# Patient Record
Sex: MALE | Race: WHITE | NOT HISPANIC OR LATINO | Employment: FULL TIME | URBAN - METROPOLITAN AREA
[De-identification: names, ages, dates, MRNs, and addresses within clinical notes are randomized per-mention and may not be internally consistent; named-entity substitution may affect disease eponyms.]

---

## 2017-11-09 ENCOUNTER — OFFICE VISIT CONVERTED (OUTPATIENT)
Dept: PULMONOLOGY | Facility: CLINIC | Age: 27
End: 2017-11-09
Attending: INTERNAL MEDICINE

## 2018-01-09 ENCOUNTER — CONVERSION ENCOUNTER (OUTPATIENT)
Dept: CARDIOLOGY | Facility: CLINIC | Age: 28
End: 2018-01-09

## 2018-01-09 ENCOUNTER — OFFICE VISIT CONVERTED (OUTPATIENT)
Dept: CARDIOLOGY | Facility: CLINIC | Age: 28
End: 2018-01-09
Attending: INTERNAL MEDICINE

## 2018-02-01 ENCOUNTER — OFFICE VISIT CONVERTED (OUTPATIENT)
Dept: PULMONOLOGY | Facility: CLINIC | Age: 28
End: 2018-02-01
Attending: INTERNAL MEDICINE

## 2018-05-24 ENCOUNTER — OFFICE VISIT CONVERTED (OUTPATIENT)
Dept: PULMONOLOGY | Facility: CLINIC | Age: 28
End: 2018-05-24
Attending: INTERNAL MEDICINE

## 2019-04-18 ENCOUNTER — OFFICE VISIT CONVERTED (OUTPATIENT)
Dept: PULMONOLOGY | Facility: CLINIC | Age: 29
End: 2019-04-18
Attending: INTERNAL MEDICINE

## 2019-12-20 ENCOUNTER — OFFICE VISIT CONVERTED (OUTPATIENT)
Dept: PULMONOLOGY | Facility: CLINIC | Age: 29
End: 2019-12-20
Attending: NURSE PRACTITIONER

## 2020-05-19 ENCOUNTER — OFFICE VISIT CONVERTED (OUTPATIENT)
Dept: PULMONOLOGY | Facility: CLINIC | Age: 30
End: 2020-05-19
Attending: INTERNAL MEDICINE

## 2020-08-11 ENCOUNTER — OFFICE VISIT CONVERTED (OUTPATIENT)
Dept: PULMONOLOGY | Facility: CLINIC | Age: 30
End: 2020-08-11
Attending: NURSE PRACTITIONER

## 2021-05-16 VITALS
BODY MASS INDEX: 45.1 KG/M2 | HEART RATE: 92 BPM | WEIGHT: 315 LBS | DIASTOLIC BLOOD PRESSURE: 77 MMHG | HEIGHT: 70 IN | SYSTOLIC BLOOD PRESSURE: 142 MMHG

## 2021-05-28 VITALS
BODY MASS INDEX: 71.81 KG/M2 | SYSTOLIC BLOOD PRESSURE: 125 MMHG | OXYGEN SATURATION: 97 % | HEART RATE: 88 BPM | HEIGHT: 69 IN | TEMPERATURE: 98.3 F | DIASTOLIC BLOOD PRESSURE: 60 MMHG | WEIGHT: 315 LBS | SYSTOLIC BLOOD PRESSURE: 110 MMHG | RESPIRATION RATE: 16 BRPM | HEIGHT: 69 IN | DIASTOLIC BLOOD PRESSURE: 73 MMHG | OXYGEN SATURATION: 96 % | BODY MASS INDEX: 46.65 KG/M2 | HEART RATE: 81 BPM | TEMPERATURE: 97.8 F | RESPIRATION RATE: 15 BRPM

## 2021-05-28 VITALS
OXYGEN SATURATION: 97 % | TEMPERATURE: 97.8 F | SYSTOLIC BLOOD PRESSURE: 127 MMHG | HEIGHT: 69 IN | RESPIRATION RATE: 16 BRPM | RESPIRATION RATE: 12 BRPM | HEART RATE: 105 BPM | TEMPERATURE: 98.1 F | WEIGHT: 315 LBS | BODY MASS INDEX: 46.65 KG/M2 | DIASTOLIC BLOOD PRESSURE: 93 MMHG | HEART RATE: 92 BPM | TEMPERATURE: 97.5 F | WEIGHT: 315 LBS | BODY MASS INDEX: 45.1 KG/M2 | HEIGHT: 69 IN | BODY MASS INDEX: 46.65 KG/M2 | DIASTOLIC BLOOD PRESSURE: 83 MMHG | OXYGEN SATURATION: 96 % | HEART RATE: 102 BPM | WEIGHT: 315 LBS | SYSTOLIC BLOOD PRESSURE: 151 MMHG | OXYGEN SATURATION: 96 % | DIASTOLIC BLOOD PRESSURE: 85 MMHG | RESPIRATION RATE: 10 BRPM | SYSTOLIC BLOOD PRESSURE: 151 MMHG | HEIGHT: 70 IN

## 2021-05-28 NOTE — PROGRESS NOTES
Patient: ESTRELLITA RILEY     Acct: [id number]     Report: #[id number]  UNIT #: [id number]     : 1990    Encounter Date:2018  PRIMARY CARE: [facility]  ***Signed***  --------------------------------------------------------------------------------------------------------------------  Chief Complaint      Encounter Date      May 24, 2018            Primary Care Provider      SHIMA smith md            Referring Provider            arline smith md            Patient Complaint      Patient is complaining of      f/u soa            VITALS      Height 5 ft 10 in / 177.8 cm      Weight 499 lbs 9 oz / 226.024942 kg      BSA 3.50 m2      BMI 71.7 kg/m2      Temperature 97.5 F / 36.39 C - Oral      Pulse 92      Respirations 12      Blood Pressure 151/83 Sitting, Left Arm      Pulse Oximetry 96%, 2 liters      Exhaled Nitrous Oxide Testin            HPI      The patient is a very pleasant patient 18 year old morbidly obese male with     chronic hypoxic respiratory failure and severe obstructive sleep apnea who     returns for 4 month follow up. He was last seen in the clinic on 18. At     that time the patient was having some difficulties at home.  He had to move in     with his mother after the death of her . He lost his job a [employer] and     has been applying for disability. He returns today stating that he is     continuing to struggle with weight loss. He is down 11 pounds since his last     office visit. He has lost overall 70 pounds in the past year.  He has 45 pounds     more to lose before he will be eligible for weight loss surgery. The patient     has not been using his BiPAP at home as he has had difficulty with the DME     company delivering the equipment and getting him set up with the appropriate     pressure settings.  He wears oxygen 24/7 at 2 liters. He has an intermittent     cough but attributes this to postnasal drainage. He is short of breath with      exertion but this improves when he wears his oxygen. He is not short of breath     at rest. He denies any fevers or chills. He has not been hospitalized recently.     He continues to take Lasix daily for history of heart failure.            Review of Systems as noted in the chart.             Past family, medical, surgical and social histories were all reviewed by myself     with the patient and are unchanged. .             Medications were reviewed by myself with the patient and updated in the chart.      He is using Brovana and Pulmicort nebulizers twice daily and Singulair.            ROS      Constitutional:  Denies: Fatigue, Fever, Weight gain, Weight loss, Chills,     Insomnia, Other      Respiratory/Breathing:  Complains of: Shortness of air, Cough, Denies: Wheezing    , Hemoptysis, Pleuritic pain, Other      Endocrine:  Denies: Polydipsia, Polyuria, Heat/cold intolerance, Diabetes, Other      Eyes:  Denies: Blurred vision, Vision Changes, Other      Ears, nose, mouth, throat:  Denies: Mouth lesions, Thrush, Throat pain,     Hoarseness, Allergies/Hay Fever, Post Nasal Drip, Headaches, Recent Head Injury    , Nose Bleeding, Neck Stiffness, Thyroid Mass, Hearing Loss, Ear Fullness, Dry     Mouth, Nasal or Sinus Pain, Dry Lips, Nasal discharge, Nasal congestion, Other      Cardiovascular:  Denies: Palpitations, Syncope, Claudication, Chest Pain, Wake     up Gasping for air, Leg Swelling, Irregular Heart Rate, Cyanosis, Dyspnea on     Exertion, Other      Gastrointestinal:  Denies: Nausea, Constipation, Diarrhea, Abdominal pain,     Vomiting, Difficulty Swallowing, Reflux/Heartburn, Dysphagia, Jaundice, Bloating    , Melena, Bloody stools, Other      Genitourinary:  Denies: Urinary frequency, Incontinence, Hematuria, Urgency,     Nocturia, Dysuria, Testicular problems, Other      Musculoskeletal:  Denies: Joint Pain, Joint Stiffness, Joint Swelling, Myalgias    , Other      Hematologic/lymphatic:  DENIES:  Lymphadenopathy, Bruising, Bleeding tendencies,     Other      Neurological:  Denies: Headache, Numbness, Weakness, Seizures, Other      Psychiatric:  Denies: Anxiety, Appropriate Effect, Depression, Other      Sleep:  No: Excessive daytime sleep, Morning Headache?, Snoring, Insomnia?,     Stop breathing at sleep?, Other      Integumentary:  Denies: Rash, Dry skin, Skin Warm to Touch, Other      Immunologic/Allergic:  Denies: Latex allergy, Seasonal allergies, Asthma,     Urticaria, Eczema, Other      Immunization status:  No: Up to date            FAMILY/SOCIAL/MEDICAL HX      Surgical History:  Yes: Cholecystectomy, No: Other Surgeries      Stroke - Family Hx:  Grandparent      Heart - Family Hx:  Grandparent      Diabetes - Family Hx:  Grandparent      Cancer/Type - Family Hx:  Grandparent (grandmother ukn  granfather leukemia)      Other Family Medical History:  Grandparent (copd)      Is Father Still Living?:  Yes      Is Mother Still Living?:  Yes      Social History:  Tobacco Use      Smoking status:  Never smoker (11/9/17)      Anticoagulation Therapy:  No      Antibiotic Prophylaxis:  No      Medical History:  Yes: Chronic Bronchitis/COPD (lung disease ), Diabetes,     Hemorrhoids/Rectal Prob (OCCASIONALLY HEARTBURN, CHOLECYSTITIS), High Blood     Pressure (CONTROLLED WITH MEDS), Miscellaneous Medical/oth (has enlarged heart )    , No: Blood Disease, Chemotherapy/Cancer, Deafness or Ringing Ears, Shortness     Of Breath, Sinus Trouble            PREVENTION      Hx Influenza Vaccination:  No      Influenza Vaccine Declined:  Yes      2 or More Falls Past Year?:  No      Fall Past Year with Injury?:  No      Hx Pneumococcal Vaccination:  No      Encouraged to follow-up with:  PCP regarding preventative exams.      Chart initiated by      Jennifer GAUTAM MA            ALLERGIES/MEDICATIONS      Allergies:        Coded Allergies:             NO KNOWN DRUG ALLERGIES (Verified  Allergy, Unknown, 5/24/18)       Medications    Last Reconciled on 5/24/18 10:25 by TUNG GAUTAM      Lisinopril* (Lisinopril*) 10 Mg Tablet      10 MG PO QDAY, #30 TAB 0 Refills         Reported         12/7/17       Albuterol/Ipratropium (Duoneb) 3 Ml Ampul.neb      3 ML INH RTQ6H, #120 NEB 1 Refill         Prov: SILVIA ZAMARRIPA         11/9/17       Furosemide* (Lasix*) 80 Mg Tablet      80 MG PO BID@09,17, #60 TAB 0 Refills         Reported         11/9/17       Neb-Budesonide (Pulmicort) 1 Mg Nebu      1 MG INH RTQDAY, #60 NEB 3 Refills         Prov: SILVIA ZAMARRIPA         10/9/17       Arformoterol Tartrate (Brovana) 15 Mcg/2 Ml Vial.neb      15 MCG INH RTBID, #60 NEB 3 Refills         Prov: SILVIA ZAMARRIPA         10/9/17       MDI-Albuterol (Ventolin HFA*) 18 Gm Hfa.aer.ad      2 PUFFS INH Q6H Y for SHORTNESS OF BREATH, #1 MDI 0 Refills         Reported         10/9/17       Montelukast Sodium (Montelukast*) 10 Mg Tablet      10 MG PO QDAY, TAB         Reported         10/9/17       Metformin Hcl (Metformin ER*) 1,000 Mg Tab.er.24      1000 MG PO BID, #30 TAB.ER 0 Refills         Reported         10/9/17       Metoprolol Tartrate (Lopressor*) 25 Mg Tablet      25 MG PO BID, TAB         Reported         4/23/13      Current Medications      Current Medications Reviewed 5/24/18            EXAM      Vital signs reviewed. The patient was saturating 96% on 2 liters.        GENERAL:  Super morbidly obese male in no acute distress on nasal cannula.        HEENT: Pupils are equally round and reactive to light and accommodation.      Extraocular muscles intact bilateral. Nares patent without hypertrophy of the     turbinates.  TM's are clear bilaterally. Small oropharynx without lesions or     erythema.       NECK:  Supple without tracheal deviation or lymphadenopathy. No thyromegaly     appreciated.       LYMPHATICS: No cervical or supraclavicular lymphadenopathy.       RESPIRATORY:  Diminished breath sounds secondary to body habitus, no wheezes,      rales or rhonchi, tympanic to percussion.      CVS: Distant heart tones secondary to body habitus, regular rate and rhythm, no     murmurs, rubs or gallops, pitting edema bilaterally, equal radial pulses.      GI: Abdomen soft and protuberant with pannus, nontender, nondistended, no     hepatomegaly appreciated.  Bowel sounds present in all 4 quadrants.       MUSCULOSKELETAL: No erythema,  warmth or fluctuance over the major joints     including the knees, ankles, wrists and elbows.        SKIN: No rashes or lesions.       NEUROLOGICAL: Alert and oriented X 3.  No focal deficits on exam. Cranial     nerves II-XII intact bilaterally.       PSYCH: Patient has appropriate mood and affect.      Vtials      Vitals:             Height 5 ft 10 in / 177.8 cm           Weight 499 lbs 9 oz / 226.540001 kg           BSA 3.50 m2           BMI 71.7 kg/m2           Temperature 97.5 F / 36.39 C - Oral           Pulse 92           Respirations 12           Blood Pressure 151/83 Sitting, Left Arm           Pulse Oximetry 96%, 2 liters            REVIEW      Results Reviewed      [facility] Results Reviewed?:  [facility]            Assessment      ASSESSMENT:        The patient is a 28 year old super morbidly obese male with a history of asthma     and chronic hypoxemic respiratory failure on long term oxygen therapy as well     as severe obstructive sleep apnea who presents for routine follow up.             1.  Acute on chronic hypoxemic and hypercapnic respiratory failure      2.  Severe obstructive sleep apnea on home BIPAP      3.  Super morbid obesity.      4.  Obesity hypoventilation syndrome.      5.  Decompensated diastolic and systolic heart failure       6. Left ventricular hypertrophy.      7.  History of moderate persistent asthma.              PLAN:      1.  Continue  Brovana and Pulmicort nebs twice daily.  He was given more     samples of Incruse today.       2. I have asked our  nurse navigator Melody  to help set up his home BiPAP.      This is extremely important as his risk of sudden death is high given his     obesity hypoventilation heart disease and severe apnea.        3. I asked the patient to establish care with cardiology for heart failure but     he has yet to do this.       4. I continued to  the patient on weight loss and encourage him. He is     doing the best he can with the resources he has .       5. The patient did not go to pulmonary rehabilitation as scheduled secondary     with difficulty with transportation.       6. Follow up in 6 months, sooner if needed. Continue to work on weight loss,     continue oxygen and nebulizer treatments.            Patient Education      ACO BMI High above 25:  Counseling Given, Encouraged weight loss, Encourage     dietary changes      Education resources provided:  Yes      Patient Education Provided:  Acute Asthma, How to use a Nebulizer      Time Spent:  > 50% /[facility]                 Disclaimer: Converted document may not contain table formatting or lab diagrams. Please see [employer] for the authenticated document.

## 2021-05-28 NOTE — PROGRESS NOTES
"Patient: POOL LAO     Acct: [id number]     Report: #[id number]  UNIT #: [id number]     : 1990    Encounter Date:2020  PRIMARY CARE: [facility]  ***Signed***  --------------------------------------------------------------------------------------------------------------------  TELEHEALTH NOTE      History of Present Illness            Chief Complaint: 3-6m f/u            Pool Lao is presenting for evaluation via Telehealth visit by phone. Verbal    consent obtained before beginning visit.            Provider spent 11 minutes with the patient during telehealth visit.            The following staff were present during the visit: Noni Calles MA, Odalis Donis DO            The patient is a supermorbid obese young gentleman with severe obstructive sleep    apnea, chronic hypercapnic respiratory failure and chronic hypoxic respiratory     failure. He wears two liters of oxygen at all times.  He is scheduled to have     bariatric schedule on 2020. He calls in today for a follow up visit asking    for an albuterol inhaler.  He states that he has moved into his own apartment     and he has to ascend two flights of stairs and by the time he is to the top of     the stairs he is so out of breath he cannot carry on a conservation and thinks     he would benefit from a rescue inhaler.  He does tell me that he had a \"mild\"     stress related heart attack and has followed up with his cardiologist in the     past month regarding that. He has gotten cardiology clearance for his bariatric     surgery. He unfortunately has let his BiPAP go back to the , he did     not keep up with it, wear it or comply with it. She says he barely snores after     he has lost almost 200 pounds with diet and exercise over the past two years     time.              I reviewed our last clinic note.                         Past Med History      Asthma      Never smoked      Vaccines not current    "   Overview of Symptoms      Pt complains of seasonal allergies            Most Recent Lab Findings      Laboratory Tests      5/10/20 21:07            Laboratory Tests            Test       5/10/20      21:07             Magnesium Level       1.72 mg/dL      (1.60-2.30)            Allergies/Medications      Allergies:        Coded Allergies:             NO KNOWN DRUG ALLERGIES (Verified  Allergy, Unknown, 5/19/20)      Medications    Last Reconciled on 5/19/20 13:58 by SILVIA ZAMARRIPA DO      MDI-Albuterol (Ventolin HFA) 8 Gm Hfa.aer.ad      2 PUFFS INH Q4-6H PRN for SHORTNESS OF BREATH, #1 INH 2 Refills         Prov: SILVIA ZAMARRIPA         5/19/20       Nitroglycerin (Nitrostat*) 0.4 Mg Tablet               Reported         5/19/20       Bisoprolol Fumarate (Bisoprolol Fumarate) 10 Mg Tablet      10 MG PO QDAY, #30 TAB         Reported         12/20/19       (eye drops)   No Conflict Check      for glaucoma         Reported         4/18/19       Empagliflozin (Jardiance) 10 Mg Tablet      10 MG PO QDAY for 30 Days, #30 TAB         Reported         4/18/19       Furosemide* (Lasix*) 80 Mg Tablet      80 MG PO BID@09,17, #60 TAB 0 Refills         Reported         11/9/17       Montelukast Sodium (Montelukast*) 10 Mg Tablet      10 MG PO QDAY, TAB         Reported         10/9/17            Plan/Instructions      Ambulatory Assessment/Plan:        Notes      New Medications      * NITROGLYCERIN (Nitrostat*) 0.4 MG TABLET:       * MDI-Albuterol (Ventolin HFA) 8 GM HFA.AER.AD: 2 PUFFS INH Q4-6H PRN SHORTNESS       OF BREATH #1      Plan/Instructions            * Plan Of Care: ()            * Chronic conditions reviewed and taken into consideration for today's treatment       plan.      * Patient instructed to seek medical attention urgently for new or worsening       symptoms.      * Patient was educated/instructed on their diagnosis, treatment and medications       prior to discharge from the clinic today.             ASSESSMENT:       1.  Dyspnea with exertion.      2. Supermorbid obesity, BMI 71.8.      3.  History of severe sleep apnea, noncompliant with BiPAP.      4. History of chronic hypercapnic and hypoxemic respiratory failure on chronic     oxygen.            PLAN:      1. I have congratulated the patient on his continued weight loss and his     scheduled surgery that is upcoming in the next few weeks.        2. In regards to surgical clearance, he has been cleared from a pulmonary     prospective.      3. I did call him in an albuterol rescue inhaler, but told him that his     difficulties breathing are more likely heart related or deconditioning.  He     could use it on an as needed basis.      4. I would like to see him back in August to see how he is doing post surgery a    nd see if he can be weaned off oxygen.      Codes:  Phone Eval 11-20 mi [id number]            Electronically signed by SILVIA ZAMARRIPA  06/09/2020 11:17       Disclaimer: Converted document may not contain table formatting or lab diagrams. Please see [employer] for the authenticated document.

## 2021-05-28 NOTE — PROGRESS NOTES
Patient: ESTRELLITA RILEY     Acct: [id number]     Report: #[id number]  UNIT #: [id number]     : 1990    Encounter Date:2020  PRIMARY CARE: MARIELY PLAZA  ***Signed***  --------------------------------------------------------------------------------------------------------------------  Chief Complaint      Encounter Date      Aug 11, 2020            Primary Care Provider      MARIELY PLAZA            Referring Provider      GINI RIVERA            Patient Complaint      Patient is complaining of      Pt here for 2-3 month follow up/SARI, Asthma            VITALS      Height 69 in / 175.26 cm      Weight 412 lbs  / 186.863643 kg      BSA 2.81 m2      BMI 60.8 kg/m2      Temperature 97.8 F / 36.56 C - Temporal      Pulse 88      Respirations 15      Blood Pressure 110/60 Sitting, Left Arm      Pulse Oximetry 97%, nasal canula , 2 lpm      Initial Exhaled Nitrous Oxide      Exhaled Nitrous Oxide Results:  9            HPI      The patient is a 30 year old supermorbidly obese gentleman who is a patient of     Dr. Donis's with severe obstructive sleep apnea, chronic hypercapnic     respiratory failure and chronic hypoxic respiratory failure.  The patient is on     2 liters of oxygen. The patient presents for follow up visit today.  The patient    states that he had a gastric sleeve procedure performed on 2020 and has     lost 80 pounds since the surgery. The patient states he is now able to walk     three miles with his oxygen in place, about 6500 to 8000 steps a day. The     patient states he is now down to 412 pounds.  The patient states his breathing     has improved. The patient states that now he can go 25-30 minutes without his     oxygen, however he does wear his oxygen at 2  liters per minute per nasal     cannula. The patient states that he would like to get a portable oxygen     concentrator as it will be easier to perform ADLs and help him to remain more     active instead of having to  carry around heavy tanks.  The patient denies any     fever, chills, night sweats, hemoptysis, purulent sputum production, chest pain,    chest tightness, swollen glands in the head and neck, nausea, vomiting or     diarrhea.  The patient denies any headaches, myalgias, changes in sense of taste    and/or smell or any other coronavirus or flu-like symptoms.  The patient states     that he does have sleep apnea, however does not wear his BiPAP at night and it     did go back to the manufacture because he did not comply with it.  The patient     states he does not wake up gasping for air and denies any morning headaches or     excessive daytime sleepiness. The patient states that he feels that his     breathing is better without his BiPAP machine. The patient states he is able to     perform ADLs without difficulty.            I have personally reviewed the review of systems, past family, social, surgical     and medical histories and I agree with those as entered in the chart.      Copies To:   SILVIA ZAMARRIPA      Constitutional:  Denies: Fatigue, Fever, Weight gain, Weight loss, Chills,     Insomnia, Other      Respiratory/Breathing:  Complains of: Shortness of air; Denies: Wheezing, Cough,    Hemoptysis, Pleuritic pain, Other      Endocrine:  Denies: Polydipsia, Polyuria, Heat/cold intolerance, Diabetes, Other      Eyes:  Denies: Blurred vision, Vision Changes, Other      Ears, nose, mouth, throat:  Denies: Mouth lesions, Thrush, Throat pain,     Hoarseness, Allergies/Hay Fever, Post Nasal Drip, Headaches, Recent Head Injury,    Nose Bleeding, Neck Stiffness, Thyroid Mass, Hearing Loss, Ear Fullness, Dry     Mouth, Nasal or Sinus Pain, Dry Lips, Nasal discharge, Nasal congestion, Other      Cardiovascular:  Denies: Palpitations, Syncope, Claudication, Chest Pain, Wake     up Gasping for air, Leg Swelling, Irregular Heart Rate, Cyanosis, Dyspnea on     Exertion, Other      Gastrointestinal:  Denies:  Nausea, Constipation, Diarrhea, Abdominal pain,     Vomiting, Difficulty Swallowing, Reflux/Heartburn, Dysphagia, Jaundice,     Bloating, Melena, Bloody stools, Other      Genitourinary:  Denies: Urinary frequency, Incontinence, Hematuria, Urgency,     Nocturia, Dysuria, Testicular problems, Other      Musculoskeletal:  Denies: Joint Pain, Joint Stiffness, Joint Swelling, Myalgias,    Other      Hematologic/lymphatic:  DENIES: Lymphadenopathy, Bruising, Bleeding tendencies,     Other      Neurological:  Denies: Headache, Numbness, Weakness, Seizures, Other      Psychiatric:  Denies: Anxiety, Appropriate Effect, Depression, Other      Sleep:  No: Excessive daytime sleep, Morning Headache?, Snoring, Insomnia?, Stop    breathing at sleep?, Other      Integumentary:  Denies: Rash, Dry skin, Skin Warm to Touch, Other      Immunologic/Allergic:  Denies: Latex allergy, Seasonal allergies, Asthma,     Urticaria, Eczema, Other      Immunization status:  No: Up to date            FAMILY/SOCIAL/MEDICAL HX      Surgical History:  Yes: Cholecystectomy, Other Surgeries (Bariatric Surgery     6/2020)      Stroke - Family Hx:  Grandparent      Heart - Family Hx:  Grandparent      Diabetes - Family Hx:  Grandparent      Cancer/Type - Family Hx:  Grandparent (grandmother ukn  granfather leukemia)      Other Family Medical History:  Grandparent (copd)      Is Father Still Living?:  Yes      Is Mother Still Living?:  Yes      Social History:  Tobacco Use      Smoking status:  Never smoker (11/9/17)      Anticoagulation Therapy:  No      Antibiotic Prophylaxis:  No      Medical History:  Yes: Chronic Bronchitis/COPD (lung disease ), Diabetes,     Glaucoma, Hemorrhoids/Rectal Prob (OCCASIONALLY HEARTBURN, CHOLECYSTITIS), High     Blood Pressure (CONTROLLED WITH MEDS), Miscellaneous Medical/oth (has enlarged     heart , sleep apnea); No: Blood Disease, Chemotherapy/Cancer, Deafness or     Ringing Ears, Shortness Of Breath, Sinus Trouble       Psychiatric History      None            PREVENTION      Hx Influenza Vaccination:  No      Influenza Vaccine Declined:  Yes      2 or More Falls in Past Year?:  No      Fall Past Year with Injury?:  No      Hx Pneumococcal Vaccination:  No      Encouraged to follow-up with:  PCP regarding preventative exams.      Chart initiated by      Argentina Quispe MA            ALLERGIES/MEDICATIONS      Allergies:        Coded Allergies:             NO KNOWN DRUG ALLERGIES (Verified  Allergy, Unknown, 8/11/20)      Medications    Last Reconciled on 8/11/20 11:31 by LOBITO MEAD,       No Active Prescriptions or Reported Meds            Current Medications      Current Medications Reviewed 8/11/20            EXAM      VITAL SIGNS:  Reviewed.        GENERAL: Morbidly obese male, well built, well nourished, in no acute distress.           NECK:  Supple without tracheal deviation or lymphadenopathy.  No thyromegaly     appreciated.      LYMPHATICS:  No cervical or supraclavicular lymphadenopathy.      HEENT: Pupils are equal, round and reactive to light. There is no scleral icte    nithin.  Nares patent without hypertrophy of the turbinates. No erythema of the     passages.   The posterior pharynx is without  lesions or erythema.      RESPIRATORY:  Lungs clear to auscultation bilaterally, no wheezes, rales or     rhonchi, normal work of breathing noted, patient able to speak full sentences     without difficulty.       CARDIOVASCULAR:  Regular rate and rhythm.  No murmurs, gallops or rubs.  No     lower extremity edema.  Equal radial pulses.        GI: Soft, nontender, nondistended, no organomegaly.  Bowel sounds present in all    four quadrants.      MUSCULOSKELETAL:  No joint effusions, erythema or warmth over the major joint     systems.      SKIN:  No rashes or lesions.      NEUROLOGIC: Cranial nerves II-XII are intact bilaterally.  Moves all     extremities. Ambulates with ease.      PSYCH:  Appropriate mood and affect.       Vtials      Vitals:             Height 69 in / 175.26 cm           Weight 412 lbs  / 186.968239 kg           BSA 2.81 m2           BMI 60.8 kg/m2           Temperature 97.8 F / 36.56 C - Temporal           Pulse 88           Respirations 15           Blood Pressure 110/60 Sitting, Left Arm           Pulse Oximetry 97%, nasal canula , 2 lpm            REVIEW      Results Reviewed      PCCS Results Reviewed?:  Yes Prev Lab Results, Yes Prev Radiology Results, Yes     Previous Mecial Records      Lab Results      I reviewed Dr. Donis's last TeleHealth visit note.            Assessment      Notes      Discontinued Medications      * MDI-Albuterol (Ventolin HFA) 8 GM HFA.AER.AD: 2 PUFFS INH Q4-6H PRN SHORTNESS       OF BREATH #1      ASSESSMENT:       1. Dyspnea on exertion, improved since gastric sleeve surgery.      2. Supermorbid obesity with a BMI of 60.8.      3. History of sleep apnea, noncompliant with BiPAP.      4.  History of chronic hypercapnic and hypoxemic respiratory failure on chronic     oxygen.            PLAN:      1.  I congratulated patient on continued weight loss. The patient is advised to     follow up with bariatric surgeon as scheduled.      2.  The patient to continue to use albuterol inhaler as needed.      3. Patient is advised to call the office, 911 or go to the ER with any new or     worsening symptoms.      4. The patient would like to have a portable oxygen concentrator as this would     help him to be more active and perform ADLs a lot easier. I will have Shaheen Figueroa, Clinical Coordinator set patient up with a portable oxygen     concentrator.      5. The patient to continue oxygen and keep SPO2 89% and above.      6. Follow up with Dr. Donis in 2-3 months, sooner if needed.            Patient Education      ACO BMI High above 25:  Counseling Given, Encouraged weight loss, Encourage     dietary changes      Patient Education Provided:  Acute Bronchitis      Time Spent:  > 50%  /Coord Care            Electronically signed by LOBITO MEAD [facility]  08/12/2020 20:07       Disclaimer: Converted document may not contain table formatting or lab diagrams. Please see [employer] for the authenticated document.

## 2021-05-28 NOTE — PROGRESS NOTES
Patient: ESTRELLITA RILEY     Acct: [id number]     Report: #[id number]  UNIT #: [id number]     : 1990    Encounter Date:2019  PRIMARY CARE: MARIELY PLAZA  ***Signed***  --------------------------------------------------------------------------------------------------------------------  Chief Complaint      Encounter Date      Dec 20, 2019            Primary Care Provider      MARIELY PLAZA            Referring Provider      GINI RIVERA            Patient Complaint      Patient is complaining of      F/U FOR SARI and surgery clearance            VITALS      Height 5 ft 9 in / 175.26 cm      Temperature 98.3 F / 36.83 C - Oral      Pulse 81      Respirations 16      Blood Pressure 125/73 Sitting, Left Arm      Pulse Oximetry 96%, nasal canula, 2 lpm      Initial Exhaled Nitrous Oxide      Exhaled Nitrous Oxide Results:  9            HPI      The patient is a 29 year old super morbidly obese male with a BMI of 71.8 and     history of severe sleep apnea, obesity hyperventilation syndrome and chronic hy    poxemic respiratory failure on 2 liters of oxygen. The patient also has a     history of systolic heart failure and is under the care of Dr. Erickson. The     patient is here for follow up today.             The patient states he is going to be having gastric bypass surgery by Dr. Garcia in [place] in 2020.  The patient needs surgical clearance    from our office. The patient states he has already received surgical clearance     from cardiology from Dr. Erickson to proceed with surgery. The patient states that     he is feeling well and he is wearing his oxygen continuously however he did stop    using his BiPAP machine because he is not able to tolerate the mask. The patient    states he continues to get short of breath with heavy exertion, shortness of     breath is moderate in severity, improved with rest. The patient denies any     coughing or wheezing. The patient states he has only  had to use his albuterol     once since his last office visit. The patient denies any fever or chills, night     sweats, hemoptysis,  purulent sputum production, chest pain or chest tightness,     palpitations, syncope, swollen glands in the head and neck, nausea or vomiting     or diarrhea. The patient states he did have a round of steroids and antibiotics     once since his last office visit however he denies any hospitalizations since     his last office visit.             I reviewed the Review of Systems, medical, surgical and family history and agree    with those as entered.      Copies To:   SILVIA ZAMARRIPA      Constitutional:  Denies: Fatigue, Fever, Weight gain, Weight loss, Chills,     Insomnia, Other      Respiratory/Breathing:  Complains of: Shortness of air; Denies: Wheezing, Cough,    Hemoptysis, Pleuritic pain, Other      Endocrine:  Denies: Polydipsia, Polyuria, Heat/cold intolerance, Diabetes, Other      Eyes:  Denies: Blurred vision, Vision Changes, Other      Ears, nose, mouth, throat:  Denies: Mouth lesions, Thrush, Throat pain,     Hoarseness, Allergies/Hay Fever, Post Nasal Drip, Headaches, Recent Head Injury,    Nose Bleeding, Neck Stiffness, Thyroid Mass, Hearing Loss, Ear Fullness, Dry     Mouth, Nasal or Sinus Pain, Dry Lips, Nasal discharge, Nasal congestion, Other      Cardiovascular:  Denies: Palpitations, Syncope, Claudication, Chest Pain, Wake     up Gasping for air, Leg Swelling, Irregular Heart Rate, Cyanosis, Dyspnea on     Exertion, Other      Gastrointestinal:  Denies: Nausea, Constipation, Diarrhea, Abdominal pain,     Vomiting, Difficulty Swallowing, Reflux/Heartburn, Dysphagia, Jaundice,     Bloating, Melena, Bloody stools, Other      Genitourinary:  Denies: Urinary frequency, Incontinence, Hematuria, Urgency,     Nocturia, Dysuria, Testicular problems, Other      Musculoskeletal:  Denies: Joint Pain, Joint Stiffness, Joint Swelling, Myalgias,    Other       Hematologic/lymphatic:  DENIES: Lymphadenopathy, Bruising, Bleeding tendencies,     Other      Neurological:  Denies: Headache, Numbness, Weakness, Seizures, Other      Psychiatric:  Denies: Anxiety, Appropriate Effect, Depression, Other      Sleep:  No: Excessive daytime sleep, Morning Headache?, Snoring, Insomnia?, Stop    breathing at sleep?, Other      Integumentary:  Denies: Rash, Dry skin, Skin Warm to Touch, Other      Immunologic/Allergic:  Denies: Latex allergy, Seasonal allergies, Asthma,     Urticaria, Eczema, Other      Immunization status:  No: Up to date            FAMILY/SOCIAL/MEDICAL HX      Surgical History:  Yes: Cholecystectomy; No: Other Surgeries      Stroke - Family Hx:  Grandparent      Heart - Family Hx:  Grandparent      Diabetes - Family Hx:  Grandparent      Cancer/Type - Family Hx:  Grandparent (grandmother ukn  granfather leukemia)      Other Family Medical History:  Grandparent (copd)      Is Father Still Living?:  Yes      Is Mother Still Living?:  Yes      Social History:  Tobacco Use      Smoking status:  Never smoker (11/9/17)      Anticoagulation Therapy:  No      Antibiotic Prophylaxis:  No      Medical History:  Yes: Chronic Bronchitis/COPD (lung disease ), Diabetes,     Glaucoma, Hemorrhoids/Rectal Prob (OCCASIONALLY HEARTBURN, CHOLECYSTITIS), High     Blood Pressure (CONTROLLED WITH MEDS), Miscellaneous Medical/oth (has enlarged     heart , sleep apnea); No: Blood Disease, Chemotherapy/Cancer, Deafness or     Ringing Ears, Shortness Of Breath, Sinus Trouble      Psychiatric History      NONE            PREVENTION      Hx Influenza Vaccination:  No      Influenza Vaccine Declined:  Yes      2 or More Falls Past Year?:  No      Fall Past Year with Injury?:  No      Hx Pneumococcal Vaccination:  No      Encouraged to follow-up with:  PCP regarding preventative exams.      Chart initiated by      JAE LERMA MA            ALLERGIES/MEDICATIONS      Allergies:        Coded  Allergies:             NO KNOWN DRUG ALLERGIES (Verified  Allergy, Unknown, 12/20/19)      Medications    Last Reconciled on 12/20/19 10:55 by LOBITO MEAD,       Bisoprolol Fumarate (Bisoprolol Fumarate) 10 Mg Tablet      10 MG PO QDAY, #30 TAB         Reported         12/20/19       (eye drops)   No Conflict Check      for glaucoma         Reported         4/18/19       Empagliflozin (Jardiance) 10 Mg Tablet      10 MG PO QDAY for 30 Days, #30 TAB         Reported         4/18/19       Furosemide* (Lasix*) 80 Mg Tablet      80 MG PO BID@09,17, #60 TAB 0 Refills         Reported         11/9/17       MDI-Albuterol (Ventolin HFA) 18 Gm Hfa.aer.ad      2 PUFFS INH Q6H PRN for SHORTNESS OF BREATH, #1 MDI 0 Refills         Reported         10/9/17       Montelukast Sodium (Montelukast*) 10 Mg Tablet      10 MG PO QDAY, TAB         Reported         10/9/17       metFORMIN ER (metFORMIN ER) 1,000 Mg Tab.er.24      1000 MG PO BID, #30 TAB.ER 0 Refills         Reported         10/9/17      Current Medications      Current Medications Reviewed 12/20/19            EXAM      VITAL SIGNS:  Reviewed.        GENERAL: Morbidly obese male in no acute distress.  The patient is on     supplemental oxygen at 2 liters per minute via nasal cannula.       NECK:  Supple without tracheal deviation or lymphadenopathy.  No thyromegaly     appreciated.      LYMPHATICS:  No cervical or supraclavicular lymphadenopathy.      HEENT: Pupils are equal, round and reactive to light. There is no scleral     icterus.  Nares patent without hypertrophy of the turbinates. No erythema of the    passages.  TMs are clear bilaterally with good cone of light. The posterior     pharynx is without  lesions or erythema.      RESPIRATORY:  Clear to auscultation bilaterally.  No wheezes, rales or rhonchi.     Normal work of breathing noted noted.  Tympanic to percussion.  The patient is     able to speak full sentences without difficulty.       CARDIOVASCULAR:  Regular rate and rhythm.  No murmurs, gallops or rubs.  No     lower extremity edema.  Equal radial pulses.        GI: Soft, nontender, nondistended, no organomegaly.  Bowel sounds present in all    four quadrants. Large pannus, difficult to palpate.       MUSCULOSKELETAL:  No joint effusions, erythema or warmth over the major joint     systems.      SKIN:  No rashes or lesions.      NEUROLOGIC: Cranial nerves II-XII are intact bilaterally.  Moves all     extremities. Ambulates with ease.      PSYCH:  Appropriate mood and affect.      Vtials      Vitals:             Height 5 ft 9 in / 175.26 cm           Temperature 98.3 F / 36.83 C - Oral           Pulse 81           Respirations 16           Blood Pressure 125/73 Sitting, Left Arm           Pulse Oximetry 96%, nasal canula, 2 lpm            REVIEW      Results Reviewed      PCCS Results Reviewed?:  Yes Prev Lab Results, Yes Prev Radiology Results, Yes     Previous M[facility]ial Records            Assessment      Notes      New Medications      * Bisoprolol Fumarate 10 MG TABLET: 10 MG PO QDAY #30      Discontinued Medications      * ARFORMOTEROL TARTRATE (Brovana) 15 MCG/2 ML VIAL.NEB: 15 MCG INH RTBID #60         Instructions: DIAGNOSIS CODE REQUIRED PRIOR TO PRESCRIBING.      * Neb-Budesonide (Pulmicort) 1 MG NEBU: 1 MG INH RTQDAY #60         Instructions: DIAGNOSIS CODE REQUIRED PRIOR TO PRESCRIBING.      * Albuterol/Ipratropium (Duoneb) 3 ML AMPUL.NEB: 3 ML INH RTQ6H #120         Instructions: DIAGNOSIS CODE REQUIRED PRIOR TO PRESCRIBING.      ASSESSMENT:      1. Chronic hypoxemic respiratory failure.      2.  Life threatening obstructive sleep apnea.      3.  Super morbid obesity, BMI 71.8.        4.  Obesity hypoventilation syndrome.      5.  Chronic hypercapnic respiratory failure.      6.  Systolic and diastolic congestive heart failure without acute exacerbation     under the care of Dr. Erickson.      7.  History of asthma.              PLAN:       1.  I spoke with Dr. Donis regarding this patient and the patient is cleared to    have gastric bypass surgery. Recommend that surgery is limited to less than 4     hours, minimal sedation and recommend early ambulation and have BiPAP available.          2. For the patient's obstructive sleep apnea, the patient states he stopped     using BiPAP because he is not able to handle wearing the mask and is refusing to    use BiPAP at this time. Risks of not using BiPAP were discussed with the patient    and the patient verbalized understanding.       3. The patient still defers any use of any inhalers or nebulizers at this time.     Continue albuterol as needed.       4. The patient states he is under the care of Dr. Erickson and has received surgery    clearance per cardiology. The patient is advised to follow up with cardiology as    scheduled.       5. Patient refused flu vaccine in the office today. Risks of refusing flu     vaccine was discussed with the patient and the patient verbalized understanding.          6. Follow up with Dr. Donis in 3-6 months, sooner if needed.            Patient Education      ACO BMI High above 25:  Encouraged weight loss      Patient Education Provided:  Influenza, Sleep Apnea      Time Spent:  > 50% /Coord Care            Electronically signed by LOBITO MEAD [facility]  01/06/2020 18:27       Disclaimer: Converted document may not contain table formatting or lab diagrams. Please see [employer] for the authenticated document.

## 2021-05-28 NOTE — PROGRESS NOTES
Patient: ESTRELLITA RILEY     Acct: [id number]     Report: #[id number]  UNIT #: [id number]     : 1990    Encounter Date:2018  PRIMARY CARE: [facility]  ***Signed***  --------------------------------------------------------------------------------------------------------------------  Chief Complaint      Encounter Date      2018            Referring Provider      Referring Provider not in look:        arline smith md            Patient Complaint      Patient is complaining of      3 month f/u            VITALS      Height 5 ft 9 in / 175.26 cm      Weight 508 lbs 2 oz / 230.605376 kg      BSA 3.51 m2      BMI 75.0 kg/m2      Temperature 97.8 F / 36.56 C - Oral      Pulse 105      Respirations 16      Blood Pressure 151/93 Sitting, Left Arm      Pulse Oximetry 96%, 2      Exhaled Nitrous Oxide Testin            HPI      The patient is a pleasant 27 year old morbidly obese male who presents for     follow up regarding chronic hypoxemic respiratory failure, obesity     hypoventilation syndrome, obstructive sleep apnea on BiPAP. He was last seen in     our office three months ago at which time he had recently been hospitalized     with pneumonia.  He returns today stating that since his last visit he     contracted the flu, but did not have to be hospitalized and was treated     outpatient. He continues to wear oxygen at all times including nighttime.     Unfortunately, he had moved in with his mother and had to switch DME companies     and was unable to get his BiPAP equipment set up at home. He has been     noncompliant with BiPAP secondary to the struggles stated.  He has also applied     for disability and is waiting to hear whether or not he has gotten disability.      He has lost his grandfather since his last visit which has been very hard on     him.  He continues to work on losing weight and has lost a total of 60 pounds     since October with diet and exercise. His ultimate goal  is to lose 100 pounds     and then get gastric bypass surgery performed. He denies chronic coughing or     wheezing. He stated that he did have this when he had the flu, but it has since     resolved.  He has swelling of his lower extremities which is ongoing and     chronic dyspnea with exertion. He is asymptomatic at rest, but continues to     wear oxygen per my orders.            Past medical, family, social and surgical history updated in the chart,     unchanged since last visit except for the flu that he contacted.            REVIEW OF SYSTEMS:  Reviewed with the patient and noted.            MEDICATIONS:  Updated in the chart by myself.            ROS      Constitutional:  Denies: Fatigue, Fever, Weight gain, Weight loss, Chills,     Insomnia, Other      Respiratory/Breathing:  Complains of: Shortness of air, Denies: Wheezing, Cough    , Hemoptysis, Pleuritic pain, Other      Endocrine:  Denies: Polydipsia, Polyuria, Heat/cold intolerance, Diabetes, Other      Eyes:  Denies: Blurred vision, Vision Changes, Other      Ears, nose, mouth, throat:  Denies: Mouth lesions, Thrush, Throat pain,     Hoarseness, Allergies/Hay Fever, Post Nasal Drip, Headaches, Recent Head Injury    , Nose Bleeding, Neck Stiffness, Thyroid Mass, Hearing Loss, Ear Fullness, Dry     Mouth, Nasal or Sinus Pain, Dry Lips, Nasal discharge, Nasal congestion, Other      Cardiovascular:  Complains of: Leg Swelling, Dyspnea on Exertion, Denies:     Palpitations, Syncope, Claudication, Chest Pain, Wake up Gasping for air,     Irregular Heart Rate, Cyanosis, Other      Gastrointestinal:  Denies: Nausea, Constipation, Diarrhea, Abdominal pain,     Vomiting, Difficulty Swallowing, Reflux/Heartburn, Dysphagia, Jaundice, Bloating    , Melena, Bloody stools, Other      Genitourinary:  Denies: Urinary frequency, Incontinence, Hematuria, Urgency,     Nocturia, Dysuria, Testicular problems, Other      Musculoskeletal:  Denies: Joint Pain, Joint  Stiffness, Joint Swelling, Myalgias    , Other      Hematologic/lymphatic:  DENIES: Lymphadenopathy, Bruising, Bleeding tendencies,     Other      Neurological:  Denies: Headache, Numbness, Weakness, Seizures, Other      Psychiatric:  Denies: Anxiety, Appropriate Effect, Depression, Other      Sleep:  No: Excessive daytime sleep, Morning Headache?, Snoring, Insomnia?,     Stop breathing at sleep?, Other      Integumentary:  Denies: Rash, Dry skin, Skin Warm to Touch, Other      Immunologic/Allergic:  Denies: Latex allergy, Seasonal allergies, Asthma,     Urticaria, Eczema, Other      Immunization status:  No: Up to date            FAMILY/SOCIAL/MEDICAL HX      Surgical History:  Yes: Cholecystectomy, No: Other Surgeries      Stroke - Family Hx:  Grandparent      Heart - Family Hx:  Grandparent      Diabetes - Family Hx:  Grandparent      Cancer/Type - Family Hx:  Grandparent      Other Family Medical History:  Grandparent      Is Father Still Living?:  Yes      Is Mother Still Living?:  Yes      Social History:  Tobacco Use      Smoking status:  Never smoker      Anticoagulation Therapy:  No      Antibiotic Prophylaxis:  No      Medical History:  Yes: Chronic Bronchitis/COPD (lung disease ), Diabetes,     Hemorrhoids/Rectal Prob (OCCASIONALLY HEARTBURN, CHOLECYSTITIS), High Blood     Pressure (CONTROLLED WITH MEDS), Miscellaneous Medical/oth (has enlarged heart )    , No: Blood Disease, Chemotherapy/Cancer, Deafness or Ringing Ears, Shortness     Of Breath, Sinus Trouble            Hx Influenza Vaccination:  No      Influenza Vaccine Declined:  Yes      2 or More Falls Past Year?:  No      Fall Past Year with Injury?:  No      Hx Pneumococcal Vaccination:  No      Encouraged to follow-up with:  PCP regarding preventative exams.      Chart initiated by      jan tsai ma            ALLERGIES/MEDICATIONS      Allergies:        Coded Allergies:             NO KNOWN DRUG ALLERGIES (Verified  Allergy, Unknown, 2/1/18)       Medications    Last Reconciled on 2/1/18 13:29 by SILVIA ZAMARRIPA DO      Lisinopril* (Lisinopril*) 10 Mg Tablet      10 MG PO QDAY, #30 TAB 0 Refills         Reported         12/7/17       Albuterol/Ipratropium (Duoneb) 3 Ml Ampul.neb      3 ML INH RTQ6H, #120 NEB 1 Refill         Prov: SILVIA ZAMARRIPA         11/9/17       Furosemide* (Lasix*) 80 Mg Tablet      80 MG PO BID@09,17, #60 TAB 0 Refills         Reported         11/9/17       Neb-Budesonide (Pulmicort) 1 Mg Nebu      1 MG INH RTQDAY, #60 NEB 3 Refills         Prov: SILVIA ZAMARRIPA         10/9/17       Arformoterol Tartrate (Brovana) 15 Mcg/2 Ml Vial.neb      15 MCG INH RTBID, #60 NEB 3 Refills         Prov: SILVIA ZAMARRIPA         10/9/17       MDI-Albuterol (Ventolin HFA*) 18 Gm Hfa.aer.ad      2 PUFFS INH Q6H Y for SHORTNESS OF BREATH, #1 MDI 0 Refills         Reported         10/9/17       Montelukast Sodium (Montelukast*) 10 Mg Tablet      10 MG PO QDAY, TAB         Reported         10/9/17       Metformin Hcl (Metformin ER*) 1,000 Mg Tab.er.24      1000 MG PO BID, #30 TAB.ER 0 Refills         Reported         10/9/17       Ondansetron Hcl (Zofran) 4 Mg Tab      4 MG PO Q6H.PRN         Reported         4/25/13       Acetaminophen/Hydrocodone 7.5/500* (Lortab 7.5/500*) 1 Tab Tablet      1 TAB PO Q4H PRN, TAB         Reported         4/25/13       Metoprolol Tartrate (Lopressor*) 25 Mg Tablet      25 MG PO BID, TAB         Reported         4/23/13      Current Medications      Current Medications Reviewed 2/1/18            EXAM      VITAL SIGNS:  Reviewed.  96% on 2 liters.        GENERAL:  Morbidly obese male, appears nondyspneic on 2 liters of oxygen.        NECK:  Supple without tracheal deviation or lymphadenopathy.  No thyromegaly     appreciated.      LYMPHATICS:  No cervical or supraclavicular lymphadenopathy.      HEENT: Pupils are equal, round and reactive to light. There is no scleral     icterus.  Nares patent without hypertrophy of the  turbinates. No erythema of     the passages.  TMs are clear bilaterally with good cone of light. The posterior     pharynx is without  lesions or erythema.      RESPIRATORY:  Diminished breath sounds bilaterally, but likely secondary to     body habitus.       CARDIOVASCULAR:  Regular rate and rhythm.  No murmurs, gallops or rubs.  Lower     extremity edema that was pitting bilaterally.  Equal radial pulses.        GI: Very protuberant abdomen with pannus overlying his knees.        MUSCULOSKELETAL:  No joint effusions, erythema or warmth over the major joint     systems.      SKIN:  No rashes or lesions.      NEUROLOGIC: Cranial nerves II-XII are intact bilaterally.  Moves all     extremities. Ambulates with ease.      PSYCH:  Appropriate mood and affect.      Vtials      Vitals:             Height 5 ft 9 in / 175.26 cm           Weight 508 lbs 2 oz / 230.895833 kg           BSA 3.51 m2           BMI 75.0 kg/m2           Temperature 97.8 F / 36.56 C - Oral           Pulse 105           Respirations 16           Blood Pressure 151/93 Sitting, Left Arm           Pulse Oximetry 96%, 2            REVIEW      Results Reviewed      PCCS Results Reviewed?:  Yes Prev Lab Results, Yes Prev Radiology Results, Yes     [facility]            PLAN      Assessment      Notes      Discontinued Medications      * MOUTHWASH (Magic Mouthwash) 1 EACH BOTTLE: 30 ML PO Q4H PRN SORE THROAT #120       Instructions: This compound contains: 50% Viscous Lidocaine 2% 30% Pepto     Bismol 20% Diphenhydramine 25 mg/10 ml      ASSESSMENT:  The patient is a 27 year old morbidly obese male with a history of     asthma as a child who presents for follow up on chronic hypoxemic respiratory     failure secondary to obesity hypoventilation syndrome, severe obstructive sleep     apnea, systolic congestive heart failure and morbid obesity.        1.  Mild intermittent asthma.      2.  Chronic dyspnea on exertion.      3.  Chronic hypoxemic  respiratory failure on long term oxygen therapy.      4.  Supermorbid obesity, BMI 75 kg/m2.      5.  Obesity/hypoventilation syndrome.      6.  Severe obstructive sleep apnea, noncompliant with BiPAP.      7.  Systolic congestive heart failure.            PLAN:      1.  Patient will continue oxygen at all times to be worn day and night. He     needs to have this bled into his BiPAP machine. I have asked him to call his     DME company today and have this fixed as he needs to be wearing it every single     night and with naps. He verbalized understanding and stated that he had had     some hardships with deaths in his family, financial issues and applying for     disability.  He is afraid that the insurance will not cover the DME company     coming out to his house. I verbalized to him that this is life or death and     that he requires BiPAP nightly.        2.  The patient was referred for pulmonary rehab, but was unable to attend     secondary to issues at home with transportation.      3. The patient was congratulated on his ongoing weight loss and I encouraged     him to continue to do so. This is his one and only way that he will not die an     early death and understands this.        4. Continue Lasix 80 mg daily.  Continue Beta blocker.  Continue ACE inhibitor.      5.  The patient will follow up in four months. He is currently applying for     disability and he definitely cannot work being as heavy and dyspneic as he is.      We are awaiting approval for his disability.                 Disclaimer: Converted document may not contain table formatting or lab diagrams. Please see [employer] for the authenticated document.

## 2021-05-28 NOTE — PROGRESS NOTES
Patient: ESTRELLITA RILEY     Acct: [id number]     Report: #[id number]  UNIT #: [id number]     : 1990    Encounter Date:2019  PRIMARY CARE: [facility]  ***Signed***  --------------------------------------------------------------------------------------------------------------------  Chief Complaint      Encounter Date      2019            Primary Care Provider      GINI RIVERA            Referring Provider      GINI RIVERA            Patient Complaint      Patient is complaining of      Pt here for follow up/SARI            VITALS      Height 5 ft 9 in / 175.26 cm      Weight 486 lbs 4 oz / 220.405459 kg      BSA 3.01 m2      BMI 71.8 kg/m2      Temperature 98.1 F / 36.72 C - Oral      Pulse 102      Respirations 10      Blood Pressure 127/85 Sitting, Left Arm      Pulse Oximetry 97%, nasal cannula, 2 lpm      Initial Exhaled Nitrous Oxide      Exhaled Nitrous Oxide Results:  9            HPI      The patient is a 28 year old supermorbid male with a BMI of 71.8.  He has     obesity hypoventilation, severe sleep apnea and chronic hypoxemic respiratory     failure on 2 liters of oxygen at all times. He also has systolic congestive     heart failure.  The patient has been lost to follow up. He has last saw me in     2018.  He comes in today to reestablish care and says that over the past one     year he has had a lot of deaths in the family and his long term girlfriend broke    up with him.  He has also had no transportation to get to and from appointments.     Updates in his medical history include a diagnosis of glaucoma and he has been     started on eye drops for that.  He has also lost follow up with cardiology, but     continues to take high doses of Lasix. His PCP has been monitoring labs.  The     patient continues to get short of breath with heavy exertion, walking up     incline.  He is not quite compliant with BiPAP. He says he wears this appr    oximately three nights per week.   He has had trouble with fitting of the mask     and has grown out a beard. he ended up having to buy his BiPAP machine because     insurance would not pay for it, so he does not have a company to bring him out     new masks or supplies. He does get his oxygen through [facility] however. He quit     taking all nebulizers and says that he is doing just fine with the oxygen. He     continues to try and lose weight and is watching calories and cutting out     carbohydrates.            Review of systems as noted.            Past medical, family, social and surgical history reviewed.  I have amended the     chart to include his new diagnosis of glaucoma.            Medications were reviewed and updated in the chart.            ROS      Constitutional:  Denies: Fatigue, Fever, Weight gain, Weight loss, Chills,     Insomnia, Other      Respiratory/Breathing:  Complains of: Shortness of air; Denies: Wheezing, Cough,    Hemoptysis, Pleuritic pain, Other      Endocrine:  Denies: Polydipsia, Polyuria, Heat/cold intolerance, Diabetes, Other      Eyes:  Denies: Blurred vision, Vision Changes, Other      Ears, nose, mouth, throat:  Denies: Mouth lesions, Thrush, Throat pain,     Hoarseness, Allergies/Hay Fever, Post Nasal Drip, Headaches, Recent Head Injury,    Nose Bleeding, Neck Stiffness, Thyroid Mass, Hearing Loss, Ear Fullness, Dry     Mouth, Nasal or Sinus Pain, Dry Lips, Nasal discharge, Nasal congestion, Other      Cardiovascular:  Denies: Palpitations, Syncope, Claudication, Chest Pain, Wake     up Gasping for air, Leg Swelling, Irregular Heart Rate, Cyanosis, Dyspnea on     Exertion, Other      Gastrointestinal:  Denies: Nausea, Constipation, Diarrhea, Abdominal pain,     Vomiting, Difficulty Swallowing, Reflux/Heartburn, Dysphagia, Jaundice,     Bloating, Melena, Bloody stools, Other      Genitourinary:  Denies: Urinary frequency, Incontinence, Hematuria, Urgency,     Nocturia, Dysuria, Testicular problems, Other       Musculoskeletal:  Denies: Joint Pain, Joint Stiffness, Joint Swelling, Myalgias,    Other      Hematologic/lymphatic:  DENIES: Lymphadenopathy, Bruising, Bleeding tendencies,     Other      Neurological:  Denies: Headache, Numbness, Weakness, Seizures, Other      Psychiatric:  Denies: Anxiety, Appropriate Effect, Depression, Other      Sleep:  No: Excessive daytime sleep, Morning Headache?, Snoring, Insomnia?, Stop    breathing at sleep?, Other      Integumentary:  Denies: Rash, Dry skin, Skin Warm to Touch, Other      Immunologic/Allergic:  Denies: Latex allergy, Seasonal allergies, Asthma,     Urticaria, Eczema, Other      Immunization status:  No: Up to date            FAMILY/SOCIAL/MEDICAL HX      Surgical History:  Yes: Cholecystectomy; No: Other Surgeries      Stroke - Family Hx:  Grandparent      Heart - Family Hx:  Grandparent      Diabetes - Family Hx:  Grandparent      Cancer/Type - Family Hx:  Grandparent (grandmother ukn  granfather leukemia)      Other Family Medical History:  Grandparent (copd)      Is Father Still Living?:  Yes      Is Mother Still Living?:  Yes      Social History:  Tobacco Use      Smoking status:  Never smoker (11/9/17)      Anticoagulation Therapy:  No      Antibiotic Prophylaxis:  No      Medical History:  Yes: Chronic Bronchitis/COPD (lung disease ), Diabetes,     Glaucoma, Hemorrhoids/Rectal Prob (OCCASIONALLY HEARTBURN, CHOLECYSTITIS), High     Blood Pressure (CONTROLLED WITH MEDS), Miscellaneous Medical/oth (has enlarged     heart , sleep apnea); No: Blood Disease, Chemotherapy/Cancer, Deafness or     Ringing Ears, Shortness Of Breath, Sinus Trouble      Psychiatric History      None            PREVENTION      Hx Influenza Vaccination:  No      Influenza Vaccine Declined:  Yes      2 or More Falls Past Year?:  No      Fall Past Year with Injury?:  No      Hx Pneumococcal Vaccination:  No      Encouraged to follow-up with:  PCP regarding preventative exams.      Chart  initiated by      Hawa Lewis MA            ALLERGIES/MEDICATIONS      Allergies:        Coded Allergies:             NO KNOWN DRUG ALLERGIES (Verified  Allergy, Unknown, 4/18/19)      Medications    Last Reconciled on 4/18/19 14:47 by SILVIA ZAMARRIPA DO      (eye drops)   No Conflict Check      for glaucoma         Reported         4/18/19       Empagliflozin (Jardiance) 10 Mg Tablet      10 MG PO QDAY for 30 Days, #30 TAB         Reported         4/18/19       Lisinopril* (Lisinopril*) 10 Mg Tablet      10 MG PO QDAY, #30 TAB 0 Refills         Reported         12/7/17       Albuterol/Ipratropium (Duoneb) 3 Ml Ampul.neb      3 ML INH RTQ6H, #120 NEB 1 Refill         Prov: SILVIA ZAMARRIPA         11/9/17       Furosemide* (Lasix*) 80 Mg Tablet      80 MG PO BID@09,17, #60 TAB 0 Refills         Reported         11/9/17       Neb-Budesonide (Pulmicort) 1 Mg Nebu      1 MG INH RTQDAY, #60 NEB 3 Refills         Prov: SILVIA ZAMARRIPA         10/9/17       Arformoterol Tartrate (Brovana) 15 Mcg/2 Ml Vial.neb      15 MCG INH RTBID, #60 NEB 3 Refills         Prov: SILVIA ZAMARRIPA         10/9/17       MDI-Albuterol (Ventolin HFA) 18 Gm Hfa.aer.ad      2 PUFFS INH Q6H PRN for SHORTNESS OF BREATH, #1 MDI 0 Refills         Reported         10/9/17       Montelukast Sodium (Montelukast*) 10 Mg Tablet      10 MG PO QDAY, TAB         Reported         10/9/17       Metformin ER (Metformin ER) 1,000 Mg Tab.er.24      1000 MG PO BID, #30 TAB.ER 0 Refills         Reported         10/9/17       Metoprolol Tartrate (Lopressor*) 25 Mg Tablet      25 MG PO BID, TAB         Reported         4/23/13      Current Medications      Current Medications Reviewed 4/18/19            EXAM      VITAL SIGNS:  Reviewed.        GENERAL:  Morbid obesity, on supplemental oxygen, dyspneic with prolonged     conversation.        NECK:  Supple without tracheal deviation or lymphadenopathy.  No thyromegaly     appreciated.      LYMPHATICS:  No cervical or  supraclavicular lymphadenopathy.      HEENT: Pupils are equal, round and reactive to light. There is no scleral     icterus.  Nares patent without hypertrophy of the turbinates. No erythema of the    passages.  TMs are clear bilaterally with good cone of light. The posterior     pharynx is without  lesions or erythema.      RESPIRATORY:  Clear to auscultation bilaterally.  No wheezes, rales or rhonchi.     Tympanic to percussion.        CARDIOVASCULAR:  Tachycardic, regular rhythm, no peripheral edema.        GI:  He has a large pannus, soft, nontender, nondistended, no organomegaly.      Bowel sounds present in all four quadrants.      MUSCULOSKELETAL:  No joint effusions, erythema or warmth over the major joint     systems.      SKIN:  No rashes or lesions.      NEUROLOGIC: Cranial nerves II-XII are intact bilaterally.  Moves all     extremities. Ambulates with ease.      PSYCH:  Appropriate mood and affect.      Vtials      Vitals:             Height 5 ft 9 in / 175.26 cm           Weight 486 lbs 4 oz / 220.205404 kg           BSA 3.01 m2           BMI 71.8 kg/m2           Temperature 98.1 F / 36.72 C - Oral           Pulse 102           Respirations 10           Blood Pressure 127/85 Sitting, Left Arm           Pulse Oximetry 97%, nasal cannula, 2 lpm            REVIEW      Results Reviewed      PCCS Results Reviewed?:  Yes Prev Lab Results, Yes Prev Radiology Results, Yes     Previous [facility] Records      Lab Results      I reviewed my last clinic note.            Assessment      Notes      New Medications      * Empagliflozin (Jardiance) 10 MG TABLET: 10 MG PO QDAY 30 Days #30      * (eye drops): glaucoma      ASSESSMENT:      1. Chronic hypoxemic respiratory failure.      2.  Life threatening obstructive sleep apnea.      3.  Super morbid obesity, BMI 71.8.        4.  Obesity hypoventilation syndrome.      5.  Chronic hypercapnic respiratory failure.      6.  Systolic and diastolic congestive heart failure  without acute exacerbation.      7.  History of asthma.              PLAN:      1.  I counseled the patient on the importance of medication compliance,     specifically BiPAP compliance. He verbalized understanding. He needs to try a     new mask immediately so that he can wear this every night. We will try to get     some supplies to him through [employer] which is also his oxygen company.      2.  I continued to  him on weight loss and dietary changes. I offered a     referral to a dietician.  He declined.      3.  He defers use of any inhalers at this time or nebulizers.      4. I encouraged him to make a follow up appointment with cardiology.      5. Follow up with me in six months for BiPAP compliance and sleep apnea follow     up.            Patient Education      ACO BMI High above 25:  Counseling Given, Encouraged weight loss, Encourage     dietary changes      Patient Education Provided:  Sleep Apnea                 Disclaimer: Converted document may not contain table formatting or lab diagrams. Please see [employer] for the authenticated document.

## 2021-06-21 ENCOUNTER — HOSPITAL ENCOUNTER (EMERGENCY)
Facility: HOSPITAL | Age: 31
Discharge: HOME | End: 2021-06-21
Attending: EMERGENCY MEDICINE | Admitting: EMERGENCY MEDICINE

## 2021-06-21 VITALS
RESPIRATION RATE: 18 BRPM | OXYGEN SATURATION: 98 % | WEIGHT: 288.8 LBS | BODY MASS INDEX: 42.78 KG/M2 | HEART RATE: 66 BPM | HEIGHT: 69 IN | SYSTOLIC BLOOD PRESSURE: 130 MMHG | DIASTOLIC BLOOD PRESSURE: 73 MMHG | TEMPERATURE: 98 F

## 2021-06-21 DIAGNOSIS — G56.01 CARPAL TUNNEL SYNDROME OF RIGHT WRIST: Primary | ICD-10-CM

## 2021-06-21 PROCEDURE — 99283 EMERGENCY DEPT VISIT LOW MDM: CPT

## 2021-06-21 RX ORDER — PREDNISONE 20 MG/1
40 TABLET ORAL DAILY
Qty: 10 TABLET | Refills: 0 | Status: SHIPPED | OUTPATIENT
Start: 2021-06-21

## 2021-06-21 NOTE — ED PROVIDER NOTES
"Subjective   Patient complaining of numbness pain and tingling at right wrist radiating into the right hand.  Patient also states been having pain and numbness tingling radiating from shoulder down arm.  Patient states does a lot of repetitive lifting at work.      History provided by:  Patient   used: No    Arm Pain  Location:  Right arm and wrist  Quality:  Numbess and tinglig  Onset quality:  Gradual  Timing:  Constant  Progression:  Worsening (worsens with movement)  Associated symptoms: no abdominal pain, no chest pain, no congestion, no cough, no diarrhea, no ear pain, no fever, no headaches, no nausea, no shortness of breath, no sore throat and no vomiting        Review of Systems   Constitutional: Negative for chills and fever.   HENT: Negative for congestion, ear pain and sore throat.    Eyes: Negative for pain.   Respiratory: Negative for cough, chest tightness and shortness of breath.    Cardiovascular: Negative for chest pain.   Gastrointestinal: Negative for abdominal pain, diarrhea, nausea and vomiting.   Genitourinary: Negative for flank pain and hematuria.   Musculoskeletal: Negative for joint swelling, neck pain and neck stiffness.   Skin: Negative for pallor.   Neurological: Negative for seizures and headaches.   All other systems reviewed and are negative.      Past Medical History:   Diagnosis Date   • Obese     pt states he had a gastric sleeve last yr, went from \"640 lb to 280\".       No Known Allergies    Past Surgical History:   Procedure Laterality Date   • CHOLECYSTECTOMY     • GASTRIC SLEEVE LAPAROSCOPIC         History reviewed. No pertinent family history.    Social History     Socioeconomic History   • Marital status: Single     Spouse name: Not on file   • Number of children: Not on file   • Years of education: Not on file   • Highest education level: Not on file   Vaping Use   • Vaping Use: Every day   Substance and Sexual Activity   • Alcohol use: Yes     Comment: " a couple of    • Drug use: Never           Objective   Physical Exam  Vitals and nursing note reviewed.   Constitutional:       General: He is not in acute distress.     Appearance: Normal appearance. He is not toxic-appearing.   HENT:      Head: Normocephalic and atraumatic.      Mouth/Throat:      Mouth: Mucous membranes are moist.   Eyes:      Extraocular Movements: Extraocular movements intact.      Pupils: Pupils are equal, round, and reactive to light.   Cardiovascular:      Rate and Rhythm: Normal rate and regular rhythm.      Pulses: Normal pulses.      Heart sounds: Normal heart sounds.   Pulmonary:      Effort: Pulmonary effort is normal. No respiratory distress.      Breath sounds: Normal breath sounds.   Abdominal:      General: Abdomen is flat.      Palpations: Abdomen is soft.      Tenderness: There is no abdominal tenderness.   Musculoskeletal:         General: Normal range of motion.        Arms:       Cervical back: Normal range of motion and neck supple.   Skin:     General: Skin is warm and dry.   Neurological:      Mental Status: He is alert and oriented to person, place, and time. Mental status is at baseline.         Procedures           ED Course                                           MDM  Number of Diagnoses or Management Options  Risk of Complications, Morbidity, and/or Mortality  Presenting problems: low  Diagnostic procedures: low  Management options: low    Patient Progress  Patient progress: stable      Final diagnoses:   Carpal tunnel syndrome of right wrist       ED Disposition  ED Disposition     ED Disposition Condition Comment    Discharge Stable           Mona Bacon MD  [address] [facility] [place] [address]  [phone]    Schedule an appointment as soon as possible for a visit in 3 days  If symptoms worsen         Medication List      New Prescriptions    predniSONE 20 MG tablet  Commonly known as: DELTASONE  Take 2 tablets by mouth Daily.           Where to Get Your  Medications      These medications were sent to [id number] - [facility] [place] - [address] AT [facility] [address] - [phone]  - [phone] FX  [address], [place] [address]    Phone: [phone]   · predniSONE 20 MG tablet          Ronaldo Reed APRN  06/21/21 1232

## 2021-06-21 NOTE — ED TRIAGE NOTES
"Pt to ED triage w/ c/o R bicep \"knots\" and RUE pain w/ numbness/coldness. Pt reports sx onset x1 week w/ worsening sx onset this am.   "

## 2022-02-15 ENCOUNTER — HOSPITAL ENCOUNTER (EMERGENCY)
Facility: HOSPITAL | Age: 32
Discharge: HOME | End: 2022-02-15
Attending: EMERGENCY MEDICINE | Admitting: EMERGENCY MEDICINE

## 2022-02-15 ENCOUNTER — APPOINTMENT (OUTPATIENT)
Dept: CT IMAGING | Facility: HOSPITAL | Age: 32
End: 2022-02-15

## 2022-02-15 VITALS
SYSTOLIC BLOOD PRESSURE: 146 MMHG | DIASTOLIC BLOOD PRESSURE: 93 MMHG | OXYGEN SATURATION: 99 % | RESPIRATION RATE: 18 BRPM | HEIGHT: 69 IN | BODY MASS INDEX: 42.45 KG/M2 | TEMPERATURE: 98.1 F | HEART RATE: 66 BPM | WEIGHT: 286.6 LBS

## 2022-02-15 DIAGNOSIS — J18.9 PNEUMONIA OF RIGHT LUNG DUE TO INFECTIOUS ORGANISM, UNSPECIFIED PART OF LUNG: ICD-10-CM

## 2022-02-15 DIAGNOSIS — R10.84 GENERALIZED ABDOMINAL PAIN: Primary | ICD-10-CM

## 2022-02-15 LAB
ALBUMIN SERPL-MCNC: 4.4 G/DL (ref 3.5–5.2)
ALBUMIN/GLOB SERPL: 1.4 G/DL
ALP SERPL-CCNC: 71 U/L (ref 39–117)
ALT SERPL W P-5'-P-CCNC: 14 U/L (ref 1–41)
ANION GAP SERPL CALCULATED.3IONS-SCNC: 7.8 MMOL/L (ref 5–15)
AST SERPL-CCNC: 17 U/L (ref 1–40)
BASOPHILS # BLD AUTO: 0.05 10*3/MM3 (ref 0–0.2)
BASOPHILS NFR BLD AUTO: 0.6 % (ref 0–1.5)
BILIRUB SERPL-MCNC: 0.3 MG/DL (ref 0–1.2)
BILIRUB UR QL STRIP: NEGATIVE
BUN SERPL-MCNC: 20 MG/DL (ref 6–20)
BUN/CREAT SERPL: 18.5 (ref 7–25)
CALCIUM SPEC-SCNC: 9.7 MG/DL (ref 8.6–10.5)
CHLORIDE SERPL-SCNC: 103 MMOL/L (ref 98–107)
CLARITY UR: CLEAR
CO2 SERPL-SCNC: 28.2 MMOL/L (ref 22–29)
COLOR UR: YELLOW
CREAT SERPL-MCNC: 1.08 MG/DL (ref 0.76–1.27)
DEPRECATED RDW RBC AUTO: 39.7 FL (ref 37–54)
EOSINOPHIL # BLD AUTO: 0.31 10*3/MM3 (ref 0–0.4)
EOSINOPHIL NFR BLD AUTO: 3.7 % (ref 0.3–6.2)
ERYTHROCYTE [DISTWIDTH] IN BLOOD BY AUTOMATED COUNT: 12.3 % (ref 12.3–15.4)
GFR SERPL CREATININE-BSD FRML MDRD: 79 ML/MIN/1.73
GLOBULIN UR ELPH-MCNC: 3.1 GM/DL
GLUCOSE SERPL-MCNC: 103 MG/DL (ref 65–99)
GLUCOSE UR STRIP-MCNC: NEGATIVE MG/DL
HCT VFR BLD AUTO: 39.6 % (ref 37.5–51)
HGB BLD-MCNC: 13.4 G/DL (ref 13–17.7)
HGB UR QL STRIP.AUTO: NEGATIVE
HOLD SPECIMEN: NORMAL
HOLD SPECIMEN: NORMAL
IMM GRANULOCYTES # BLD AUTO: 0.03 10*3/MM3 (ref 0–0.05)
IMM GRANULOCYTES NFR BLD AUTO: 0.4 % (ref 0–0.5)
KETONES UR QL STRIP: NEGATIVE
LEUKOCYTE ESTERASE UR QL STRIP.AUTO: NEGATIVE
LIPASE SERPL-CCNC: 27 U/L (ref 13–60)
LYMPHOCYTES # BLD AUTO: 2.51 10*3/MM3 (ref 0.7–3.1)
LYMPHOCYTES NFR BLD AUTO: 29.6 % (ref 19.6–45.3)
MCH RBC QN AUTO: 30 PG (ref 26.6–33)
MCHC RBC AUTO-ENTMCNC: 33.8 G/DL (ref 31.5–35.7)
MCV RBC AUTO: 88.8 FL (ref 79–97)
MONOCYTES # BLD AUTO: 0.72 10*3/MM3 (ref 0.1–0.9)
MONOCYTES NFR BLD AUTO: 8.5 % (ref 5–12)
NEUTROPHILS NFR BLD AUTO: 4.85 10*3/MM3 (ref 1.7–7)
NEUTROPHILS NFR BLD AUTO: 57.2 % (ref 42.7–76)
NITRITE UR QL STRIP: NEGATIVE
NRBC BLD AUTO-RTO: 0 /100 WBC (ref 0–0.2)
PH UR STRIP.AUTO: 7 [PH] (ref 5–8)
PLATELET # BLD AUTO: 240 10*3/MM3 (ref 140–450)
PMV BLD AUTO: 10.2 FL (ref 6–12)
POTASSIUM SERPL-SCNC: 4.4 MMOL/L (ref 3.5–5.2)
PROT SERPL-MCNC: 7.5 G/DL (ref 6–8.5)
PROT UR QL STRIP: NEGATIVE
RBC # BLD AUTO: 4.46 10*6/MM3 (ref 4.14–5.8)
SARS-COV-2 RNA PNL SPEC NAA+PROBE: NOT DETECTED
SODIUM SERPL-SCNC: 139 MMOL/L (ref 136–145)
SP GR UR STRIP: 1.02 (ref 1–1.03)
UROBILINOGEN UR QL STRIP: NORMAL
WBC NRBC COR # BLD: 8.47 10*3/MM3 (ref 3.4–10.8)
WHOLE BLOOD HOLD SPECIMEN: NORMAL
WHOLE BLOOD HOLD SPECIMEN: NORMAL

## 2022-02-15 PROCEDURE — 81003 URINALYSIS AUTO W/O SCOPE: CPT | Performed by: EMERGENCY MEDICINE

## 2022-02-15 PROCEDURE — U0004 COV-19 TEST NON-CDC HGH THRU: HCPCS | Performed by: EMERGENCY MEDICINE

## 2022-02-15 PROCEDURE — 74177 CT ABD & PELVIS W/CONTRAST: CPT

## 2022-02-15 PROCEDURE — 99283 EMERGENCY DEPT VISIT LOW MDM: CPT

## 2022-02-15 PROCEDURE — 85025 COMPLETE CBC W/AUTO DIFF WBC: CPT | Performed by: EMERGENCY MEDICINE

## 2022-02-15 PROCEDURE — 25010000002 CEFTRIAXONE PER 250 MG: Performed by: EMERGENCY MEDICINE

## 2022-02-15 PROCEDURE — 96365 THER/PROPH/DIAG IV INF INIT: CPT

## 2022-02-15 PROCEDURE — 80053 COMPREHEN METABOLIC PANEL: CPT | Performed by: EMERGENCY MEDICINE

## 2022-02-15 PROCEDURE — U0005 INFEC AGEN DETEC AMPLI PROBE: HCPCS | Performed by: EMERGENCY MEDICINE

## 2022-02-15 PROCEDURE — 83690 ASSAY OF LIPASE: CPT | Performed by: EMERGENCY MEDICINE

## 2022-02-15 PROCEDURE — 0 IOPAMIDOL PER 1 ML: Performed by: EMERGENCY MEDICINE

## 2022-02-15 PROCEDURE — 36415 COLL VENOUS BLD VENIPUNCTURE: CPT

## 2022-02-15 RX ORDER — SODIUM CHLORIDE 0.9 % (FLUSH) 0.9 %
10 SYRINGE (ML) INJECTION AS NEEDED
Status: DISCONTINUED | OUTPATIENT
Start: 2022-02-15 | End: 2022-02-15 | Stop reason: HOSPADM

## 2022-02-15 RX ORDER — AZITHROMYCIN 250 MG/1
TABLET, FILM COATED ORAL
Qty: 6 TABLET | Refills: 0 | Status: SHIPPED | OUTPATIENT
Start: 2022-02-15

## 2022-02-15 RX ORDER — AZELASTINE HYDROCHLORIDE 0.5 MG/ML
SOLUTION/ DROPS OPHTHALMIC
COMMUNITY
Start: 2022-01-28

## 2022-02-15 RX ORDER — CEFTRIAXONE SODIUM 1 G/50ML
1 INJECTION, SOLUTION INTRAVENOUS ONCE
Status: COMPLETED | OUTPATIENT
Start: 2022-02-15 | End: 2022-02-15

## 2022-02-15 RX ORDER — CYCLOBENZAPRINE HCL 10 MG
10 TABLET ORAL
COMMUNITY

## 2022-02-15 RX ADMIN — CEFTRIAXONE SODIUM 1 G: 1 INJECTION, SOLUTION INTRAVENOUS at 11:44

## 2022-02-15 RX ADMIN — IOPAMIDOL 100 ML: 755 INJECTION, SOLUTION INTRAVENOUS at 09:13

## 2022-02-15 RX ADMIN — SODIUM CHLORIDE 1000 ML: 9 INJECTION, SOLUTION INTRAVENOUS at 10:05

## 2022-02-15 NOTE — ED PROVIDER NOTES
"Subjective   Pool Lao is a 31 y.o. male who presents to the emergency department today with complaints of gradually worsening abdominal pain over the past two days. Pt states the pain was in his right flank and has since radiated to his RLQ. Pt states pain is exacerbated with deep inspiration as well as palpation to the abdomen. He complains of associated nausea, decreased PO intake and chills. He denies dysuria, hematuria, diarrhea, chest pain, fever, or diarrhea.      History provided by:  Patient   used: No        Review of Systems   Constitutional: Positive for appetite change and chills. Negative for fever.   HENT: Negative for congestion, rhinorrhea and sore throat.    Eyes: Negative for pain and visual disturbance.   Respiratory: Negative for apnea, cough, chest tightness and shortness of breath.    Cardiovascular: Negative for chest pain and palpitations.   Gastrointestinal: Positive for abdominal pain and nausea. Negative for diarrhea and vomiting.   Genitourinary: Negative for difficulty urinating and dysuria.   Musculoskeletal: Negative for joint swelling and myalgias.   Skin: Negative for color change.   Neurological: Negative for seizures and headaches.   Psychiatric/Behavioral: Negative.    All other systems reviewed and are negative.      Past Medical History:   Diagnosis Date   • CHF (congestive heart failure) (HCC)    • Obese     pt states he had a gastric sleeve last yr, went from \"640 lb to 280\".       No Known Allergies    Past Surgical History:   Procedure Laterality Date   • CHOLECYSTECTOMY     • GASTRIC SLEEVE LAPAROSCOPIC         History reviewed. No pertinent family history.    Social History     Socioeconomic History   • Marital status: Single   Tobacco Use   • Smoking status: Never Smoker   Vaping Use   • Vaping Use: Every day   Substance and Sexual Activity   • Alcohol use: Yes     Comment: occ.   • Drug use: Never         Objective   Physical Exam  Vitals and " nursing note reviewed.   Constitutional:       General: He is not in acute distress.     Appearance: Normal appearance. He is not toxic-appearing.   HENT:      Head: Normocephalic and atraumatic.      Jaw: There is normal jaw occlusion.   Eyes:      General: Lids are normal.      Extraocular Movements: Extraocular movements intact.      Conjunctiva/sclera: Conjunctivae normal.      Pupils: Pupils are equal, round, and reactive to light.   Cardiovascular:      Rate and Rhythm: Normal rate and regular rhythm.      Pulses: Normal pulses.      Heart sounds: Normal heart sounds.   Pulmonary:      Effort: Pulmonary effort is normal. No respiratory distress.      Breath sounds: Normal breath sounds. No wheezing or rhonchi.   Abdominal:      General: Abdomen is flat.      Palpations: Abdomen is soft.      Tenderness: There is abdominal tenderness in the right lower quadrant. There is guarding (mild). There is no rebound.   Musculoskeletal:         General: Normal range of motion.      Cervical back: Normal range of motion and neck supple.      Right lower leg: No edema.      Left lower leg: No edema.   Skin:     General: Skin is warm and dry.   Neurological:      Mental Status: He is alert and oriented to person, place, and time. Mental status is at baseline.   Psychiatric:         Mood and Affect: Mood normal.         Procedures         ED Course     Labs Reviewed   COMPREHENSIVE METABOLIC PANEL - Abnormal; Notable for the following components:       Result Value    Glucose 103 (*)     All other components within normal limits    Narrative:     GFR Normal >60  Chronic Kidney Disease <60  Kidney Failure <15     COVID-19,APTIMA PANTHER ([facility]/ MARY, NP/OP SWAB IN UTM/VTM/[facility] [employer] MEDIA,24 HR TAT - Normal    Narrative:     Fact sheet for providers: https://www.fda.gov/media/428802/download     Fact sheet for patients: https://www.fda.gov/media/434783/download    Test performed by RT PCR.   LIPASE - Normal    URINALYSIS W/ MICROSCOPIC IF INDICATED (NO CULTURE) - Normal    Narrative:     Urine microscopic not indicated.   CBC WITH AUTO DIFFERENTIAL - Normal   RAINBOW DRAW    Narrative:     The following orders were created for panel order Ashley Draw.  Procedure                               Abnormality         Status                     ---------                               -----------         ------                     Green Top (Gel)[[id number]]                                  Final result               Lavender Top[[id number]]                                     Final result               Gold Top - SST[[id number]]                                   Final result               Light Blue Top[[id number]]                                   Final result                 Please view results for these tests on the individual orders.   CBC AND DIFFERENTIAL    Narrative:     The following orders were created for panel order CBC & Differential.  Procedure                               Abnormality         Status                     ---------                               -----------         ------                     CBC Auto Differential[[id number]]        Normal              Final result                 Please view results for these tests on the individual orders.   GREEN TOP   LAVENDER TOP   GOLD TOP - SST   LIGHT BLUE TOP     No Radiology Exams Resulted Within Past 24 Hours                                         MDM    Final diagnoses:   Generalized abdominal pain   Pneumonia of right lung due to infectious organism, unspecified part of lung       Documentation assistance provided by daniel Hopkins.  Information recorded by the daniel was done at my direction and has been verified and validated by me.     Holyl Hopknis  02/15/22 0838       Bill Meadows DO  02/17/22 0609

## 2022-02-15 NOTE — DISCHARGE INSTRUCTIONS
Plenty of fluids.  Take medication as directed.  Take Tylenol ibuprofen as needed for pain.  Return for worsening symptoms.  Follow-up with your doctor in 2 days if no better.  
General